# Patient Record
Sex: FEMALE | Race: WHITE | NOT HISPANIC OR LATINO | Employment: FULL TIME | URBAN - METROPOLITAN AREA
[De-identification: names, ages, dates, MRNs, and addresses within clinical notes are randomized per-mention and may not be internally consistent; named-entity substitution may affect disease eponyms.]

---

## 2023-08-25 ENCOUNTER — NON-PROVIDER VISIT (OUTPATIENT)
Dept: OCCUPATIONAL MEDICINE | Facility: CLINIC | Age: 53
End: 2023-08-25

## 2023-08-25 DIAGNOSIS — Z02.1 PRE-EMPLOYMENT DRUG SCREENING: ICD-10-CM

## 2023-08-25 DIAGNOSIS — Z02.1 PRE-EMPLOYMENT HEALTH SCREENING EXAMINATION: ICD-10-CM

## 2023-08-25 LAB
AMP AMPHETAMINE: NORMAL
COC COCAINE: NORMAL
INT CON NEG: NORMAL
INT CON POS: NORMAL
MET METHAMPHETAMINES: NORMAL
OPI OPIATES: NORMAL
PCP PHENCYCLIDINE: NORMAL
POC DRUG COMMENT 753798-OCCUPATIONAL HEALTH: NEGATIVE
THC: NORMAL

## 2023-08-25 PROCEDURE — 80305 DRUG TEST PRSMV DIR OPT OBS: CPT | Performed by: NURSE PRACTITIONER

## 2024-04-22 ENCOUNTER — HOSPITAL ENCOUNTER (EMERGENCY)
Facility: MEDICAL CENTER | Age: 54
End: 2024-04-22
Attending: EMERGENCY MEDICINE
Payer: OTHER MISCELLANEOUS

## 2024-04-22 ENCOUNTER — NON-PROVIDER VISIT (OUTPATIENT)
Dept: OCCUPATIONAL MEDICINE | Facility: CLINIC | Age: 54
End: 2024-04-22
Payer: COMMERCIAL

## 2024-04-22 ENCOUNTER — APPOINTMENT (OUTPATIENT)
Dept: RADIOLOGY | Facility: MEDICAL CENTER | Age: 54
End: 2024-04-22
Attending: EMERGENCY MEDICINE
Payer: OTHER MISCELLANEOUS

## 2024-04-22 VITALS
BODY MASS INDEX: 31.4 KG/M2 | DIASTOLIC BLOOD PRESSURE: 90 MMHG | RESPIRATION RATE: 16 BRPM | HEART RATE: 92 BPM | HEIGHT: 70 IN | TEMPERATURE: 97.4 F | SYSTOLIC BLOOD PRESSURE: 142 MMHG | OXYGEN SATURATION: 98 % | WEIGHT: 219.36 LBS

## 2024-04-22 DIAGNOSIS — S93.402D SPRAIN OF LEFT ANKLE, UNSPECIFIED LIGAMENT, SUBSEQUENT ENCOUNTER: ICD-10-CM

## 2024-04-22 DIAGNOSIS — T14.8XXA ABRASION: ICD-10-CM

## 2024-04-22 DIAGNOSIS — Z02.1 PRE-EMPLOYMENT DRUG SCREENING: ICD-10-CM

## 2024-04-22 DIAGNOSIS — Z02.83 ENCOUNTER FOR DRUG SCREENING: ICD-10-CM

## 2024-04-22 PROCEDURE — 80305 DRUG TEST PRSMV DIR OPT OBS: CPT | Performed by: PREVENTIVE MEDICINE

## 2024-04-22 PROCEDURE — 99283 EMERGENCY DEPT VISIT LOW MDM: CPT

## 2024-04-22 PROCEDURE — 99026 IN-HOSPITAL ON CALL SERVICE: CPT | Performed by: PREVENTIVE MEDICINE

## 2024-04-22 PROCEDURE — 73562 X-RAY EXAM OF KNEE 3: CPT | Mod: LT

## 2024-04-22 PROCEDURE — 82075 ASSAY OF BREATH ETHANOL: CPT | Performed by: PREVENTIVE MEDICINE

## 2024-04-22 PROCEDURE — 90471 IMMUNIZATION ADMIN: CPT

## 2024-04-22 PROCEDURE — 73130 X-RAY EXAM OF HAND: CPT | Mod: RT

## 2024-04-22 PROCEDURE — 700111 HCHG RX REV CODE 636 W/ 250 OVERRIDE (IP): Performed by: EMERGENCY MEDICINE

## 2024-04-22 PROCEDURE — 73610 X-RAY EXAM OF ANKLE: CPT | Mod: LT

## 2024-04-22 PROCEDURE — 90715 TDAP VACCINE 7 YRS/> IM: CPT | Performed by: EMERGENCY MEDICINE

## 2024-04-22 PROCEDURE — 73130 X-RAY EXAM OF HAND: CPT | Mod: LT

## 2024-04-22 RX ORDER — IBUPROFEN 800 MG/1
800 TABLET ORAL EVERY 8 HOURS PRN
Qty: 30 TABLET | Refills: 0 | Status: SHIPPED | OUTPATIENT
Start: 2024-04-22

## 2024-04-22 RX ADMIN — CLOSTRIDIUM TETANI TOXOID ANTIGEN (FORMALDEHYDE INACTIVATED), CORYNEBACTERIUM DIPHTHERIAE TOXOID ANTIGEN (FORMALDEHYDE INACTIVATED), BORDETELLA PERTUSSIS TOXOID ANTIGEN (GLUTARALDEHYDE INACTIVATED), BORDETELLA PERTUSSIS FILAMENTOUS HEMAGGLUTININ ANTIGEN (FORMALDEHYDE INACTIVATED), BORDETELLA PERTUSSIS PERTACTIN ANTIGEN, AND BORDETELLA PERTUSSIS FIMBRIAE 2/3 ANTIGEN 0.5 ML: 5; 2; 2.5; 5; 3; 5 INJECTION, SUSPENSION INTRAMUSCULAR at 17:46

## 2024-04-22 NOTE — ED TRIAGE NOTES
Ambulatory to triage w/ c/o L knee pain/abrasion, L ankle pain and L hand pain secondary to a mechanical GLF at work today.

## 2024-04-22 NOTE — Clinical Note
Gayle Hidalgo was seen and treated in our emergency department on 4/22/2024.  She may return to work on 04/24/2024.       If you have any questions or concerns, please don't hesitate to call.      Tomas Vail D.O.

## 2024-04-22 NOTE — LETTER
"  FORM C-4:  EMPLOYEE’S CLAIM FOR COMPENSATION/ REPORT OF INITIAL TREATMENT  EMPLOYEE’S CLAIM - PROVIDE ALL INFORMATION REQUESTED   First Name Gayle Last Name Rocky Birthdate 1970  Sex female Claim Number   Home Address [address] [facility] [address]   [place] [place] [facility] [place]             Zip [address]                                   Age  53 y.o. Height  1.778 m (5' 10\") Weight  99.5 kg (219 lb 5.7 oz) [facility]     Mailing Address [address] [facility] [address]  [place]              Zip [address] Telephone  [phone] (home)  Primary Language Spoken   Insurer   Third Party   MICHAELA PINON Employee's Occupation (Job Title) When Injury or Occupational Disease Occurred     Employer's Name [employer] Telephone [phone]    Employer Address [address] [facility] [place] [facility] [[address]] Zip [address]   Date of Injury  4/22/2024       Hour of Injury  3:13 PM Date Employer Notified  4/22/2024 Last Day of Work after Injury or Occupational Disease  4/22/2024 Supervisor to Whom Injury Reported  S/S Carrington Romero   Address or Location of Accident (if applicable) Work [1]   What were you doing at the time of accident? 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 Be specific and answer in detail. Use additional sheet if necessary)  Tripped over a rock and fell onto asphalt   If you believe that you have an occupational disease, when did you first have knowledge of the disability and it relationship to your employment? N/A Witnesses to the Accident  Camera at site   Nature of Injury or Occupational Disease  Workers' Compensation Part(s) of Body Injured or Affected  Lower Leg (L), Upper Leg (L), Ankle (L)    I CERTIFY THAT THE ABOVE IS TRUE AND CORRECT TO THE BEST OF MY KNOWLEDGE AND THAT I HAVE PROVIDED THIS INFORMATION IN ORDER TO OBTAIN THE BENEFITS OF [place] AND " [place]).  I HEREBY AUTHORIZE ANY PHYSICIAN, CHIROPRACTOR, SURGEON, PRACTITIONER, OR OTHER PERSON, ANY HOSPITAL, INCLUDING [facility] OR [facility], ANY MEDICAL SERVICE ORGANIZATION, ANY INSURANCE COMPANY, OR OTHER INSTITUTION OR ORGANIZATION TO RELEASE TO EACH OTHER, ANY MEDICAL OR OTHER INFORMATION, INCLUDING BENEFITS PAID OR PAYABLE, PERTINENT TO THIS INJURY OR DISEASE, EXCEPT INFORMATION RELATIVE TO DIAGNOSIS, TREATMENT AND/OR COUNSELING FOR AIDS, PSYCHOLOGICAL CONDITIONS, ALCOHOL OR CONTROLLED SUBSTANCES, FOR WHICH I MUST GIVE SPECIFIC AUTHORIZATION.  A PHOTOSTAT OF THIS AUTHORIZATION SHALL BE AS VALID AS THE ORIGINAL.  Date                                      Place                                                                             Employee’s Signature   THIS REPORT MUST BE COMPLETED AND MAILED WITHIN 3 WORKING DAYS OF TREATMENT   Place [facility], EMERGENCY DEPT                       Name of Facility [facility]   Date  4/22/2024 Diagnosis  (S93.402D) Sprain of left ankle, unspecified ligament, subsequent encounter  (T14.8XXA) Abrasion Is there evidence the injured employee was under the influence of alcohol and/or another controlled substance at the time of accident?   Hour  6:52 PM Description of Injury or Disease  Sprain of left ankle, unspecified ligament, subsequent encounter  Abrasion No   Treatment  Walking boot  Have you advised the patient to remain off work five days or more?         No   X-Ray Findings  Negative If Yes   From Date    To Date      From information given by the employee, together with medical evidence, can you directly connect this injury or occupational disease as job incurred? No If No, is employee capable of: Full Duty  No Modified Duty  Yes   Is additional medical care by a physician indicated? Yes If Modified Duty, Specify any Limitations /  "Restrictions   Limited standing until seen by Workmen's Comp.   Do you know of any previous injury or disease contributing to this condition or occupational disease? No    Date 4/22/2024 Print Doctor’s Name Tomas Vail I certify the employer’s copy of this form was mailed on:   Address [address] [place] [id number] [phone] INSURER’S USE ONLY   Provider’s Tax ID Number [id number] Telephone Dept: [phone]    Doctor’s Signature edward-TOMAS Saldaña D.O. Degree        Form C-4 (rev.10/07)                                                                         BRIEF DESCRIPTION OF RIGHTS AND BENEFITS  (Pursuant to [place] 616C.050)    Notice of Injury or Occupational Disease (Incident Report Form C-1): If an injury or occupational disease (OD) arises out of and in the course of employment, you must provide written notice to your employer as soon as practicable, but no later than 7 days after the accident or OD. Your employer shall maintain a sufficient supply of the required forms.    Claim for Compensation (Form C-4): If medical treatment is sought, the form C-4 is available at the place of initial treatment. A completed \"Claim for Compensation\" (Form C-4) must be filed within 90 days after an accident or OD. The treating physician or chiropractor must, within 3 working days after treatment, complete and mail to the employer, the employer's insurer and third-party , the Claim for Compensation.    Medical Treatment: If you require medical treatment for your on-the-job injury or OD, you may be required to select a physician or chiropractor from a list provided by your workers’ compensation insurer, if it has contracted with an Organization for Managed Care (MCO) or Preferred Provider Organization (PPO) or providers of health care. If your employer has not entered into a contract with an MCO or PPO, you may select a physician or chiropractor from the Panel of Physicians and Chiropractors. " Any medical costs related to your industrial injury or OD will be paid by your insurer.    Temporary Total Disability (TTD): If your doctor has certified that you are unable to work for a period of at least 5 consecutive days, or 5 cumulative days in a 20-day period, or places restrictions on you that your employer does not accommodate, you may be entitled to TTD compensation.    Temporary Partial Disability (TPD): If the wage you receive upon reemployment is less than the compensation for TTD to which you are entitled, the insurer may be required to pay you TPD compensation to make up the difference. TPD can only be paid for a maximum of 24 months.    Permanent Partial Disability (PPD): When your medical condition is stable and there is an indication of a PPD as a result of your injury or OD, within 30 days, your insurer must arrange for an evaluation by a rating physician or chiropractor to determine the degree of your PPD. The amount of your PPD award depends on the date of injury, the results of the PPD evaluation, your age and wage.    Permanent Total Disability (PTD): If you are medically certified by a treating physician or chiropractor as permanently and totally disabled and have been granted a PTD status by your insurer, you are entitled to receive monthly benefits not to exceed 66 2/3% of your average monthly wage. The amount of your PTD payments is subject to reduction if you previously received a lump-sum PPD award.    Vocational Rehabilitation Services: You may be eligible for vocational rehabilitation services if you are unable to return to the job due to a permanent physical impairment or permanent restrictions as a result of your injury or occupational disease.    Transportation and Per Galilea Reimbursement: You may be eligible for travel expenses and per galilea associated with medical treatment.    Reopening: You may be able to reopen your claim if your condition worsens after claim closure.     Appeal  Process: If you disagree with a written determination issued by the insurer or the insurer does not respond to your request, you may appeal to the Department of Administration, , by following the instructions contained in your determination letter. You must appeal the determination within 70 days from the date of the determination letter at [address], [address], [place], [place] [address], or [address], [address], [place], [place] [address]. If you disagree with the  decision, you may appeal to the Department of Administration, . You must file your appeal within 30 days from the date of the  decision letter at [address], [address], [place], [place] [address], or [address], [address], [place], [place] [address]. If you disagree with a decision of an , you may file a petition for judicial review with the District Court. You must do so within 30 days of the Appeal Officer’s decision. You may be represented by an  at your own expense or you may contact the [facility] for possible representation.    [place]  for Injured Workers (NAIW): If you disagree with a  decision, you may request that NAIW represent you without charge at an  Hearing. For information regarding denial of benefits, you may contact the [facility] at: [address], [address], [place] [address], [phone], or [address], [address], [place] [address], [phone]    To File a Complaint with the Division: If you wish to file a complaint with the  of the Division of Industrial Relations (DIR),  please contact the Workers’ Compensation Section, [address], [address], [place], [place] [address], telephone [phone], or [address], [address], [place], [place] [address], telephone [phone].    For assistance with Workers’ Compensation  Issues: You may contact the [facility] Office for Consumer Health Assistance, [facility] [facility], [address], [place] [address], Toll Free 1-609.337.6586, Web site: http://[facility].[place].gov/Programs/[place] E-mail: china@[facility].nv.gov  D-2 (rev. 10/20)              __________________________________________________________________                                    _________________            Employee Name / Signature                                                                                                                            Date

## 2024-04-23 NOTE — ED PROVIDER NOTES
"ER Provider Note      Primary Care Provider: Pcp Pt States None    CHIEF COMPLAINT   Chief Complaint   Patient presents with    T-5000 GLF    Knee Pain    Ankle Pain       EXTERNAL RECORDS REVIEWED  Outpatient Notes   [facility]    HPI/ROS  LIMITATION TO HISTORY   Select: : None  OUTSIDE HISTORIAN(S):  None    Gayle Hidalgo is a 53 y.o. female who presents to the ED complaining of left knee pain, left ankle pain, and bilateral hand pain.  Patient states that she had a ground-level fall today while at work after tripping.  She states that she had no loss of consciousness, did not strike her head, has no neck or back pain, no chest pain or shortness of breath.    PAST MEDICAL HISTORY  Past Medical History:   Diagnosis Date    Diabetes (HCC)        SURGICAL HISTORY  History reviewed. No pertinent surgical history.    FAMILY HISTORY  History reviewed. No pertinent family history.    SOCIAL HISTORY   reports that she has quit smoking. Her smoking use included cigarettes. She does not have any smokeless tobacco history on file. She reports current alcohol use. She reports that she does not use drugs.    CURRENT MEDICATIONS  Previous Medications    No medications on file       ALLERGIES  Allergies   Allergen Reactions    Cephalosporins      Hemolytic anemia         PHYSICAL EXAM  BP (!) 146/104   Pulse 100   Temp 36.5 °C (97.7 °F) (Temporal)   Resp 16   Ht 1.778 m (5' 10\")   Wt 99.5 kg (219 lb 5.7 oz)   SpO2 99%   BMI 31.47 kg/m²   Constitutional: Well developed, well nourished. No acute distress.  HEENT: Normocephalic, atraumatic. Posterior pharynx clear and moist.  Eyes:  EOMI. Normal sclera.  Neck: Supple, Full range of motion, nontender.  Chest/Pulmonary: clear to ausculation. Symmetrical expansion.   Musculoskeletal: No deformity, no edema, neurovascular intact.  Tenderness to bilateral palms, nontender left or right wrist.  Neurovascular intact distally.  Left lower extremity with superficial abrasion " to the left knee, tenderness to the left lateral malleolus, neurovascular tact distally.  Right lower extremity unremarkable.  No tenderness, neurovascular tact distally.  Nontender hips.  Neuro: Clear speech, appropriate, cooperative, cranial nerves II-XII grossly intact.  Psych: Normal mood and affect     DIAGNOSTIC STUDIES    EKG/LABS  None    RADIOLOGY  The attending emergency physician has independently interpreted the diagnostic imaging associated with this visit and am waiting the final reading from the radiologist.   My preliminary interpretation is a follows: See below    Radiologist interpretation:  DX-HAND 3+ RIGHT   Final Result      No radiographic evidence of acute traumatic injury.      DX-HAND 3+ LEFT   Final Result      No fracture detected.      DX-ANKLE 3+ VIEWS LEFT   Final Result      1.  On the lateral projection there is linear density at the dorsal aspect of the navicular. This could be incidental versus small avulsion fracture. Correlate for tenderness.   2.  Ankle joint effusion.      DX-KNEE 3 VIEWS LEFT   Final Result      No fracture detected.             COURSE & MEDICAL DECISION MAKING     ASSESSMENT, COURSE AND PLAN  Care Narrative: This is a 53-year-old female here for evaluation after ground-level fall.  She is nontoxic and afebrile and comfortable.  She did not strike her head so CT of the head is not warranted at this time.  X-rays show no acute finding.  On the left ankle with regard to the linear density, the patient has no tenderness to this area, however a boot will be placed, she will follow-up as outpatient.        DISPOSITION AND DISCUSSIONS  I have discussed management of the patient with the following physicians and GILMA's: None    Discussion of management with other QHP or appropriate source(s): None    Escalation of care considered, and ultimately not performed: No IV fluids indicated.    Barriers to care at this time, including but not limited to: Patient does not have  established PCP.     Decision tools and prescription drugs considered including, but not limited to: Flexeril and Motrin.    FINAL DIANGOSIS  1. Sprain of left ankle, unspecified ligament, subsequent encounter    2. Abrasion    3.      Left knee contusion

## 2024-04-23 NOTE — ED NOTES
Discharge instructions given to pt. Prescriptions sent to pt's pharmacy. Pt educated, verbalizes understanding. All belongings accounted for. Pt ambulated out of ED with steady gait to go home.

## 2024-04-26 LAB
AMP AMPHETAMINE: NORMAL
BREATH ALCOHOL COMMENT: 0
COC COCAINE: NORMAL
INT CON NEG: NORMAL
INT CON POS: NORMAL
MET METHAMPHETAMINES: NORMAL
OPI OPIATES: NORMAL
PCP PHENCYCLIDINE: NORMAL
POC BREATHALIZER: 0 PERCENT (ref 0–0.01)
POC DRUG COMMENT 753798-OCCUPATIONAL HEALTH: NORMAL
THC: NORMAL

## 2024-04-30 ENCOUNTER — OCCUPATIONAL MEDICINE (OUTPATIENT)
Dept: OCCUPATIONAL MEDICINE | Facility: CLINIC | Age: 54
End: 2024-04-30
Payer: COMMERCIAL

## 2024-04-30 VITALS
HEART RATE: 83 BPM | HEIGHT: 70 IN | RESPIRATION RATE: 16 BRPM | BODY MASS INDEX: 31.7 KG/M2 | WEIGHT: 221.4 LBS | DIASTOLIC BLOOD PRESSURE: 68 MMHG | OXYGEN SATURATION: 98 % | TEMPERATURE: 96.5 F | SYSTOLIC BLOOD PRESSURE: 108 MMHG

## 2024-04-30 DIAGNOSIS — S80.212D ABRASION OF LEFT KNEE, SUBSEQUENT ENCOUNTER: ICD-10-CM

## 2024-04-30 DIAGNOSIS — S93.402D SPRAIN OF LEFT ANKLE, UNSPECIFIED LIGAMENT, SUBSEQUENT ENCOUNTER: ICD-10-CM

## 2024-04-30 NOTE — PROGRESS NOTES
"Subjective:     Gayle Hidalgo is a 53 y.o. female who presents for Other (New wc doi: 04/22/2024 left leg/ankle, feeling the same rm 16)      DOI: 4/22/24. LAYNE: (copied from ED visit) complaining of left knee pain, left ankle pain, and bilateral hand pain.  Patient states that she had a ground-level fall today while at work after tripping.  She states that she had no loss of consciousness, did not strike her head, has no neck or back pain, no chest pain or shortness of breath.    Today patient states symptoms have improved.  She states that she does have peripheral neuropathy so she does not have any sensation in the left lower foot.  She has been applying Polysporin to the abrasion on her knee.  She took Ibuprofen the first day, but has not needed any since. She denies signs and symptoms of infection.  She states there is no knee or joint instability.  She also has not noted any increase in swelling or bruising.  She is wearing the walking boot with minimal difficulty.  She is tolerating light duty without difficulty.  MRI requested at this time to identify if whether acute injury present given MRI results.  Plan of care discussed with patient.    ROS: All systems were reviewed on intake form, form was reviewed and signed. See scanned documents in media. Pertinent positives and negatives included in HPI.    PMH: No pertinent past medical history to this problem  MEDS: Medications were reviewed in Epic  ALLERGIES:   Allergies   Allergen Reactions    Cephalosporins      Hemolytic anemia       SOCHX: Works as  at [email or web address]  FH: No pertinent family history to this problem       Objective:     /68 (BP Location: Right arm, Patient Position: Sitting, BP Cuff Size: Adult long)   Pulse 83   Temp 35.8 °C (96.5 °F) (Temporal)   Resp 16   Ht 1.778 m (5' 10\")   Wt 100 kg (221 lb 6.4 oz)   SpO2 98%   BMI 31.77 kg/m²     [unfilled]    Left lower extremity with superficial abrasion to the " left knee, slight tenderness to the left lateral malleolus, strength intact distally.  Peripheral neuropathy is baseline.  No edema, erythema, or abnormal warmth to the joint.  Antalgic gait noted, secondary to walking boot.    DX-ANKLE 3+ VIEWS LEFT  Final Result     1.  On the lateral projection there is linear density at the dorsal aspect of the navicular. This could be incidental versus small avulsion fracture. Correlate for tenderness.  2.  Ankle joint effusion.        Right lower extremity unremarkable.  No tenderness, neurovascular tact distally.  Nontender hips.    Assessment/Plan:       1. Sprain of left ankle, unspecified ligament, subsequent encounter  - MR-ANKLE W/O LEFT; Future  - MR-ANKLE W/O LEFT; Future  - Referral to Radiology    2. Abrasion of left knee, subsequent encounter    Released to Restricted Duty FROM 4/30/2024 TO 5/14/2024  Recommend sedentary job duties and limited walking until cleared.  Must utilize walking boot while at work.  Follow-up in 2 weeks  Restricted duty  MRI requested  Recommend OTC Tylenol/ibuprofen if needed, ice, elevation, and walking boot while at work  Strict ED precautions discussed with patient  Return to clinic sooner with new or worsening symptoms; if after hours go to ED for further evaluation and management of symptoms    Differential diagnosis, natural history, supportive care, and indications for immediate follow-up discussed.    Approximately 25 minutes were spent in reviewing notes, preparing for visit, obtaining history, exam and evaluation, patient counseling/education and post visit documentation/orders.

## 2024-04-30 NOTE — LETTER
PHYSICIAN’S AND CHIROPRACTIC PHYSICIAN'S                       PROGRESS REPORT  CERTIFICATION OF DISABILITY Claim Number:        Social Security Number:     Patient’s Name:Gayle Hidalgo Date of Injury:  4/22/2024     Employer:  [employer]  Name of MCO (if applicable)   Patient’s Job Description/Occupation:      Previous Injuries/Diseases/Surgeries Contributing to the Condition:  Neuropathy    Diagnosis:  (S93.402D) Sprain of left ankle, unspecified ligament, subsequent encounter  (S80.212D) Abrasion of left knee, subsequent encounterDiagnoses of Sprain of left ankle, unspecified ligament, subsequent encounter and Abrasion of left knee, subsequent encounter were pertinent to this visit.   Related to the Industrial Injury? Yes   Explain:[unfilled]   Objective Medical Findings: Left lower extremity with superficial abrasion to the left knee, slight tenderness to the left lateral malleolus, strength intact distally.  Peripheral neuropathy is baseline.  No edema, erythema, or abnormal warmth to the joint.  Antalgic gait noted, secondary to walking boot.    DX-ANKLE 3+ VIEWS LEFT  Final Result     1.  On the lateral projection there is linear density at the dorsal aspect of the navicular. This could be incidental versus small avulsion fracture. Correlate for tenderness.  2.  Ankle joint effusion.        Right lower extremity unremarkable.  No tenderness, neurovascular tact distally.  Nontender hips.    []  None - Discharged                         Stable     [x]  Yes     []  No                           Ratable     []  Yes     [x]  No   [x]  Generally Improved                        []  Condition Worsened                              []  Condition Same         May Have Suffered a Permanent Disability     []  Yes     [x]  No   Treatment Plan:   Follow-up in 2 weeks   Restricted duty   MRI requested   Recommend OTC  Tylenol/ibuprofen if needed, ice, elevation, and walking boot while at work   Strict ED precautions discussed with patient   Return to clinic sooner with new or worsening symptoms; if after hours go to ED for further evaluation and management of symptoms      [] No Change in Therapy                    [] PT/OT Prescribed                   [x] Medication May be Used While Working    [] Case Management                          [] PT/OT Discontinued  []  Consultation    [x] Further Diagnostic Studies    []  Prescription(s)                        [] Released to FULL DUTY /No Restrictions on (Date):                                                                                           [] Certified TOTALLY TEMPORARILY DISABLED (Indicate Dates): From:                       To:                               [x] Released to RESTRICTED/Modified Duty on (Date): From:4/30/24 to:5/14/24   Restrictions Are:     []  Permanent[x]  Temporary   [] No Sitting                   []  No Standing          []  No Pulling             [x]  Other:  Recommend sedentary job duties and limited walking until cleared.  Must utilize walking boot while at work.  [] No Bending at Waist  []  No Stooping          []  No Lifting                                                                            [] No Carrying                []  No Walking           [x]  Lifting Restricted to (lbs.):< or = to 10 pounds  [] No Pushing                 []  No Climbing         []  No Reaching Above Shoulders   Date of Next Visit: 5/14/2024 @ 8:15 am Date of this Exam:  4/30/2024 Physician/Chiropractic Physician Name: TONIE Jack Physician/Chiropractic Physician Signature:   [place] Hsu DO MPH   D-39 (Rev. 2/24)

## 2024-05-14 ENCOUNTER — OCCUPATIONAL MEDICINE (OUTPATIENT)
Dept: OCCUPATIONAL MEDICINE | Facility: CLINIC | Age: 54
End: 2024-05-14
Payer: COMMERCIAL

## 2024-05-14 VITALS
SYSTOLIC BLOOD PRESSURE: 112 MMHG | OXYGEN SATURATION: 98 % | RESPIRATION RATE: 18 BRPM | DIASTOLIC BLOOD PRESSURE: 80 MMHG | WEIGHT: 221.8 LBS | HEIGHT: 70 IN | TEMPERATURE: 96.1 F | HEART RATE: 82 BPM | BODY MASS INDEX: 31.75 KG/M2

## 2024-05-14 DIAGNOSIS — S93.402D SPRAIN OF LEFT ANKLE, UNSPECIFIED LIGAMENT, SUBSEQUENT ENCOUNTER: ICD-10-CM

## 2024-05-14 DIAGNOSIS — S80.212D ABRASION OF LEFT KNEE, SUBSEQUENT ENCOUNTER: ICD-10-CM

## 2024-05-14 PROCEDURE — 99213 OFFICE O/P EST LOW 20 MIN: CPT | Performed by: NURSE PRACTITIONER

## 2024-05-14 PROCEDURE — 3079F DIAST BP 80-89 MM HG: CPT | Performed by: NURSE PRACTITIONER

## 2024-05-14 PROCEDURE — 3074F SYST BP LT 130 MM HG: CPT | Performed by: NURSE PRACTITIONER

## 2024-05-14 NOTE — PROGRESS NOTES
"Subjective:     Gayle Hidalgo is a 53 y.o. female who presents for Follow-Up (Doi: 4/22/24 LEFT LEG/ ANKLE BETTER )    DOI: 4/22/24. LAYNE: (copied from ED visit) complaining of left knee pain, left ankle pain, and bilateral hand pain.  Patient states that she had a ground-level fall today while at work after tripping.  She states that she had no loss of consciousness, did not strike her head, has no neck or back pain, no chest pain or shortness of breath.     Today patient states symptoms have improved.  She states that she does have peripheral neuropathy so she does not have any sensation in the left lower foot.  She has been applying Polysporin to the abrasion on her knee.  She took Ibuprofen the first day, but has not needed any since. She denies signs and symptoms of infection.  She states there is no knee or joint instability.  She also has not noted any increase in swelling or bruising.  She is wearing the walking boot with minimal difficulty.  She is tolerating light duty without difficulty.  MRI currently pending.  Plan of care discussed with patient.        ROS: All systems were reviewed on intake form, form was reviewed and signed. See scanned documents in media. Pertinent positives and negatives included in HPI.    PMH: No pertinent past medical history to this problem  MEDS: Medications were reviewed in Epic  ALLERGIES:   Allergies   Allergen Reactions    Cephalosporins      Hemolytic anemia       SOCHX: Works as  at [employer]  FH: No pertinent family history to this problem       Objective:     /80 (BP Location: Left arm, Patient Position: Sitting, BP Cuff Size: Adult)   Pulse 82   Temp (!) 35.6 °C (96.1 °F) (Temporal)   Resp 18   Ht 1.778 m (5' 10\")   Wt 101 kg (221 lb 12.8 oz)   SpO2 98%   BMI 31.82 kg/m²     [unfilled]      Left lower extremity with superficial abrasion to the left knee, slight tenderness to the left lateral malleolus, strength intact distally. "  Peripheral neuropathy is baseline.  No edema, erythema, or abnormal warmth to the joint.  Antalgic gait noted, secondary to walking boot.    DX-ANKLE 3+ VIEWS LEFT  Final Result     1.  On the lateral projection there is linear density at the dorsal aspect of the navicular. This could be incidental versus small avulsion fracture. Correlate for tenderness.  2.  Ankle joint effusion.     Assessment/Plan:       1. Sprain of left ankle, unspecified ligament, subsequent encounter    2. Abrasion of left knee, subsequent encounter      FROM  5/14/24  TO  5/29/24   Follow-up in 2 weeks   Restricted duty   MRI pending  Recommend OTC Tylenol/ibuprofen if needed, ice, elevation, and ankle splint  Weightbearing as tolerated  Strict ED precautions discussed with patient   Return to clinic sooner with new or worsening symptoms; if after hours go to ED for further evaluation and management of symptoms        Differential diagnosis, natural history, supportive care, and indications for immediate follow-up discussed.    Work Status: Restricted Duty - see [employer] or other state/federal worker's compensation forms for specific restrictions if applicable  Follow up 2 weeks    Differential diagnosis, natural history, supportive care, and indications for immediate follow-up discussed.    Approximately 30 minutes were spent in reviewing notes, preparing for visit, obtaining history, exam and evaluation, patient counseling/education, and post visit documentation/orders. Significant time was spent completing state/federal worker's compensation forms.